# Patient Record
Sex: MALE | Race: WHITE | NOT HISPANIC OR LATINO | Employment: FULL TIME | URBAN - METROPOLITAN AREA
[De-identification: names, ages, dates, MRNs, and addresses within clinical notes are randomized per-mention and may not be internally consistent; named-entity substitution may affect disease eponyms.]

---

## 2023-05-18 ENCOUNTER — OFFICE VISIT (OUTPATIENT)
Dept: URGENT CARE | Facility: URGENT CARE | Age: 38
End: 2023-05-18
Payer: OTHER MISCELLANEOUS

## 2023-05-18 VITALS
DIASTOLIC BLOOD PRESSURE: 83 MMHG | OXYGEN SATURATION: 98 % | BODY MASS INDEX: 25.07 KG/M2 | SYSTOLIC BLOOD PRESSURE: 141 MMHG | HEART RATE: 89 BPM | WEIGHT: 190 LBS | RESPIRATION RATE: 16 BRPM | TEMPERATURE: 98.1 F

## 2023-05-18 DIAGNOSIS — S51.802A OPEN WOUND OF LEFT FOREARM, INITIAL ENCOUNTER: Primary | ICD-10-CM

## 2023-05-18 PROCEDURE — 12002 RPR S/N/AX/GEN/TRNK2.6-7.5CM: CPT | Performed by: FAMILY MEDICINE

## 2023-05-18 NOTE — PROGRESS NOTES
SUBJECTIVE:     Chief Complaint   Patient presents with     Urgent Care     Right forearm laceration with sheet metal MIIC -booster TDAP 6/21/2018. Patient states he has a history of fainting with these things     Billy Allen is a 37 year old male who presents to the clinic with a laceration on the left forearm sustained 4 hour(s) ago (around 9am).  This is a work related and accidental injury.  Was loading sheet metal and accidentally got cut  Mechanism of injury: sheet metal.    Associated symptoms: Denies numbness, weakness, or loss of function  Last tetanus booster within 10 years: yes, 2018    Patient is left handed    EXAM:   The patient appears today in alert,no apparent distress distress  VITALS: BP (!) 141/83   Pulse 89   Temp 98.1  F (36.7  C) (Tympanic)   Resp 16   Wt 86.2 kg (190 lb)   SpO2 98%   BMI 25.07 kg/m      Size of laceration: 3 centimeters  Characteristics of the laceration: bleeding- mild, clean and straight  Tendon function intact: yes  Sensation to light touch intact: yes  Pulses intact: yes  Picture included in patient's chart: no    Assessment:  Open wound of left forearm, initial encounter    PLAN:  PROCEDURE NOTE::  Wound was locally injected with 4 cc's of Lidocaine 1% with epinephrine  Good anesthesia was obtained  Prepped and draped in the usual sterile fashion  Wound cleaned with betadine/saline solution  Wound cleaned with Shur-Clens  Laceration was closed using 6 4-0 nylon interrupted sutures  After care instructions:  Keep wound clean and dry for the next 24-48 hours  Sutures out in 10 days  Signs of infection discussed today  Apply anti-bacterial ointment for 7 days  Discussed the probability of scarring    Jesús Oviedo MD  May 18, 2023 2:12 PM

## 2023-05-18 NOTE — PATIENT INSTRUCTIONS
Keep wound clean and dry for 24 hours  Topical antibiotic ointment to wound for 7 days  Monitor for wound infection    Suture removal in 10 days

## 2024-06-20 ENCOUNTER — HOSPITAL ENCOUNTER (EMERGENCY)
Facility: CLINIC | Age: 39
Discharge: HOME | End: 2024-06-20
Attending: SOCIAL WORKER | Admitting: SOCIAL WORKER
Payer: OTHER MISCELLANEOUS

## 2024-06-20 VITALS
WEIGHT: 202.38 LBS | BODY MASS INDEX: 26.82 KG/M2 | HEIGHT: 73 IN | DIASTOLIC BLOOD PRESSURE: 84 MMHG | TEMPERATURE: 98.6 F | RESPIRATION RATE: 16 BRPM | HEART RATE: 69 BPM | OXYGEN SATURATION: 98 % | SYSTOLIC BLOOD PRESSURE: 121 MMHG

## 2024-06-20 DIAGNOSIS — R55 SYNCOPE AND COLLAPSE: ICD-10-CM

## 2024-06-20 LAB
ANION GAP SERPL CALCULATED.3IONS-SCNC: 11 MMOL/L (ref 7–15)
BASOPHILS # BLD AUTO: 0.1 10E3/UL (ref 0–0.2)
BASOPHILS NFR BLD AUTO: 1 %
BUN SERPL-MCNC: 14.5 MG/DL (ref 6–20)
CALCIUM SERPL-MCNC: 9.1 MG/DL (ref 8.6–10)
CHLORIDE SERPL-SCNC: 99 MMOL/L (ref 98–107)
CREAT SERPL-MCNC: 0.97 MG/DL (ref 0.67–1.17)
DEPRECATED HCO3 PLAS-SCNC: 24 MMOL/L (ref 22–29)
EGFRCR SERPLBLD CKD-EPI 2021: >90 ML/MIN/1.73M2
EOSINOPHIL # BLD AUTO: 0.1 10E3/UL (ref 0–0.7)
EOSINOPHIL NFR BLD AUTO: 1 %
ERYTHROCYTE [DISTWIDTH] IN BLOOD BY AUTOMATED COUNT: 12.3 % (ref 10–15)
GLUCOSE BLDC GLUCOMTR-MCNC: 233 MG/DL (ref 70–99)
GLUCOSE SERPL-MCNC: 176 MG/DL (ref 70–99)
HCT VFR BLD AUTO: 41.5 % (ref 40–53)
HGB BLD-MCNC: 14.1 G/DL (ref 13.3–17.7)
HOLD SPECIMEN: NORMAL
HOLD SPECIMEN: NORMAL
IMM GRANULOCYTES # BLD: 0 10E3/UL
IMM GRANULOCYTES NFR BLD: 0 %
LYMPHOCYTES # BLD AUTO: 0.9 10E3/UL (ref 0.8–5.3)
LYMPHOCYTES NFR BLD AUTO: 11 %
MCH RBC QN AUTO: 31.2 PG (ref 26.5–33)
MCHC RBC AUTO-ENTMCNC: 34 G/DL (ref 31.5–36.5)
MCV RBC AUTO: 92 FL (ref 78–100)
MONOCYTES # BLD AUTO: 0.7 10E3/UL (ref 0–1.3)
MONOCYTES NFR BLD AUTO: 9 %
NEUTROPHILS # BLD AUTO: 6.4 10E3/UL (ref 1.6–8.3)
NEUTROPHILS NFR BLD AUTO: 78 %
NRBC # BLD AUTO: 0 10E3/UL
NRBC BLD AUTO-RTO: 0 /100
PLATELET # BLD AUTO: 258 10E3/UL (ref 150–450)
POTASSIUM SERPL-SCNC: 3.9 MMOL/L (ref 3.4–5.3)
RBC # BLD AUTO: 4.52 10E6/UL (ref 4.4–5.9)
SODIUM SERPL-SCNC: 134 MMOL/L (ref 135–145)
TROPONIN T SERPL HS-MCNC: <6 NG/L
WBC # BLD AUTO: 8.2 10E3/UL (ref 4–11)

## 2024-06-20 PROCEDURE — 36415 COLL VENOUS BLD VENIPUNCTURE: CPT | Performed by: EMERGENCY MEDICINE

## 2024-06-20 PROCEDURE — 93005 ELECTROCARDIOGRAM TRACING: CPT

## 2024-06-20 PROCEDURE — 82962 GLUCOSE BLOOD TEST: CPT

## 2024-06-20 PROCEDURE — 84484 ASSAY OF TROPONIN QUANT: CPT | Performed by: SOCIAL WORKER

## 2024-06-20 PROCEDURE — 85025 COMPLETE CBC W/AUTO DIFF WBC: CPT | Performed by: SOCIAL WORKER

## 2024-06-20 PROCEDURE — 85048 AUTOMATED LEUKOCYTE COUNT: CPT | Performed by: EMERGENCY MEDICINE

## 2024-06-20 PROCEDURE — 82374 ASSAY BLOOD CARBON DIOXIDE: CPT | Performed by: SOCIAL WORKER

## 2024-06-20 PROCEDURE — 99284 EMERGENCY DEPT VISIT MOD MDM: CPT

## 2024-06-20 ASSESSMENT — COLUMBIA-SUICIDE SEVERITY RATING SCALE - C-SSRS
1. IN THE PAST MONTH, HAVE YOU WISHED YOU WERE DEAD OR WISHED YOU COULD GO TO SLEEP AND NOT WAKE UP?: NO
6. HAVE YOU EVER DONE ANYTHING, STARTED TO DO ANYTHING, OR PREPARED TO DO ANYTHING TO END YOUR LIFE?: NO
2. HAVE YOU ACTUALLY HAD ANY THOUGHTS OF KILLING YOURSELF IN THE PAST MONTH?: NO

## 2024-06-20 ASSESSMENT — ACTIVITIES OF DAILY LIVING (ADL): ADLS_ACUITY_SCORE: 35

## 2024-06-20 NOTE — Clinical Note
Billy Allen was seen and treated in our emergency department on 6/20/2024.  He may return to work on 06/24/2024.       If you have any questions or concerns, please don't hesitate to call.      Mary Aranda MD

## 2024-06-20 NOTE — ED TRIAGE NOTES
"Pt arrives after a syncopal episode at work. States he felt dizzy and nauseated and then passed out. Witness states he looked like he slowly lowered himself down, unsure how long he was out for. States he is now feeling tired and hungry. Notes he was stressed about a meeting and did not eat breakfast this AM due to nerves. ABCs intact.     BP (!) 129/98   Pulse 62   Temp 98.6  F (37  C) (Temporal)   Resp 20   Ht 1.854 m (6' 1\")   Wt 91.8 kg (202 lb 6.1 oz)   SpO2 100%   BMI 26.70 kg/m         Triage Assessment (Adult)       Row Name 06/20/24 1334          Triage Assessment    Airway WDL WDL        Respiratory WDL    Respiratory WDL WDL        Cardiac WDL    Cardiac WDL WDL        Cognitive/Neuro/Behavioral WDL    Cognitive/Neuro/Behavioral WDL WDL                     "

## 2024-06-20 NOTE — ED PROVIDER NOTES
"  Emergency Department Note      History of Present Illness     Chief Complaint  Syncope    HPI  Billy Allen is a 38 year old male with a history of anxiety on sertraline, who presented to the emergency room with a chief complaint of syncope.  Patient reported that yesterday he was mildly hung over after having some wine the day previously and was also very anxious for work presentation today.  Today while he was presenting and was standing with a group of people he began feeling lightheaded dizzy, walked away and then lost consciousness.  Witnesses reported no seizure-like activity.  He felt nauseated and sweaty afterwards.  No palpitations, chest pain.  No sudden abdominal pain or headache.  Reports that after waking up from his syncopal episode he felt some neck tightness and stiffness which has not gone away.    Independent Historian  None    Review of External Notes  I reviewed the telemedicine visit from April 2022, patient refilling sertraline, notes depression and anxiety history  Past Medical History   Medical History and Problem List  Anxiety  Moderate Episode of Recurrent Major Depressive Disorder  Nicotine Dependence   Adjustment Disorder with mixed anxiety and depressed mood     Medications  Zoloft    Surgical History   Head and Neck Surgery, Benign Tumor     Physical Exam   Patient Vitals for the past 24 hrs:   BP Temp Temp src Pulse Resp SpO2 Height Weight   06/20/24 1515 121/84 -- -- 69 16 98 % -- --   06/20/24 1448 124/88 -- -- 68 16 97 % -- --   06/20/24 1336 (!) 129/98 98.6  F (37  C) Temporal 62 20 100 % 1.854 m (6' 1\") 91.8 kg (202 lb 6.1 oz)     General: Overall stable and nontoxic appearing  HEENT: Conjunctivae clear, no scleral icterus, mucous membranes moist; able to turn neck 45 degrees to each side  Neuro: Alert, conversant; no facial droop, no slurred speech; strength and sensation grossly intact and equal bilaterally in upper and lower extremities; gait is intact without ataxia   CV: " Regular rate, regular rhythm, radial and DP pulses equal  Respiratory: No signs of respiratory distress, lungs clear to auscultation bilaterally   Abdomen: Soft, without rigidity or rebound throughout  MSK: No tenderness to palpation over the C-spine, no lower extremity swelling    Diagnostics   Lab Results   Labs Ordered and Resulted from Time of ED Arrival to Time of ED Departure   BASIC METABOLIC PANEL - Abnormal       Result Value    Sodium 134 (*)     Potassium 3.9      Chloride 99      Carbon Dioxide (CO2) 24      Anion Gap 11      Urea Nitrogen 14.5      Creatinine 0.97      GFR Estimate >90      Calcium 9.1      Glucose 176 (*)    GLUCOSE BY METER - Abnormal    GLUCOSE BY METER POCT 233 (*)    TROPONIN T, HIGH SENSITIVITY - Normal    Troponin T, High Sensitivity <6     CBC WITH PLATELETS AND DIFFERENTIAL    WBC Count 8.2      RBC Count 4.52      Hemoglobin 14.1      Hematocrit 41.5      MCV 92      MCH 31.2      MCHC 34.0      RDW 12.3      Platelet Count 258      % Neutrophils 78      % Lymphocytes 11      % Monocytes 9      % Eosinophils 1      % Basophils 1      % Immature Granulocytes 0      NRBCs per 100 WBC 0      Absolute Neutrophils 6.4      Absolute Lymphocytes 0.9      Absolute Monocytes 0.7      Absolute Eosinophils 0.1      Absolute Basophils 0.1      Absolute Immature Granulocytes 0.0      Absolute NRBCs 0.0         Imaging  No orders to display       EKG   Electrocardiogram  ECG taken at 1341  Sinus rhythm, nonspecific intraventricular conduction delay, no signs of ischemia   Rate 61 bpm. DC interval 158. QRS duration 120. QTc 394     Normal axis, no signs of WPW, QT wnl, no signs of Brugada, no signs of ARVC or HOCM     Independent Interpretation  None  ED Course    Medications Administered  Medications - No data to display    Discussion of Management  None    Social Determinants of Health adding to complexity of care  None    ED Course  ED Course as of 06/20/24 2151   Thu Jun 20, 2024   2922  I obtained history and examined the patient as noted above.      Medical Decision Making / Diagnosis   CMS Diagnoses: None    MIPS     None    MDM  Billy Allen is a 38 year old male with a history of anxiety depression who presented to the emergency department after syncopal episode.  In emergency room patient overall stable and nontoxic-appearing.  No sudden chest pain or headache to suggest aortic dissection or intracranial catastrophe.  Stable here without chest pain.  No C-spine point tenderness and able to turn the neck 45 degrees bilaterally, [place] CT C-spine negative.  No new neurologic deficits.  Afebrile here and no neck stiffness previous to his syncopal episode therefore I feel that meningitis is quite unlikely.  Patient declined any analgesia in the emergency department.  Description of his episode sounds to be quite vasovagal with the preceding symptoms, feeling nauseated and sweaty afterwards.  Doubt cardiac etiology.  EKG without cardiac concerning signs.  Doubt ACS as etiology for her symptoms.  Patient has not seen a primary care for some time, I have placed a referral for this.  We discussed strict return precaution, he and his wife verbalized understanding.  Discharged in stable condition.    Disposition  The patient was discharged.     ICD-10 Codes:    ICD-10-CM    1. Syncope and collapse  R55 Primary Care Referral           Discharge Medications  Discharge Medication List as of 6/20/2024  3:23 PM            Scribe Disclosure:  I, Chey Limon, am serving as a scribe at 3:03 PM on 6/20/2024 to document services personally performed by Mary Aranda MD based on my observations and the provider's statements to me.        Mary Aranda MD  06/20/24 7897

## 2024-06-20 NOTE — DISCHARGE INSTRUCTIONS
You were seen in the emergency department after per your exam was overall reassuring, we did not see signs of an acute emergency at this time.      I have sent a referral for primary care physician they should give you a call to follow-up.    If you faint again, if you have chest pain or shortness of breath, if you have new numbness weakness tingling anywhere, if you have severely worsening neck pain, or other concerning symptoms please come back to the emergency department.

## 2024-06-21 LAB
ATRIAL RATE - MUSE: 61 BPM
DIASTOLIC BLOOD PRESSURE - MUSE: NORMAL MMHG
INTERPRETATION ECG - MUSE: NORMAL
P AXIS - MUSE: 69 DEGREES
PR INTERVAL - MUSE: 158 MS
QRS DURATION - MUSE: 120 MS
QT - MUSE: 392 MS
QTC - MUSE: 394 MS
R AXIS - MUSE: 35 DEGREES
SYSTOLIC BLOOD PRESSURE - MUSE: NORMAL MMHG
T AXIS - MUSE: 57 DEGREES
VENTRICULAR RATE- MUSE: 61 BPM

## 2024-07-18 ENCOUNTER — OFFICE VISIT (OUTPATIENT)
Dept: INTERNAL MEDICINE | Facility: CLINIC | Age: 39
End: 2024-07-18
Attending: SOCIAL WORKER
Payer: COMMERCIAL

## 2024-07-18 VITALS
BODY MASS INDEX: 25.84 KG/M2 | TEMPERATURE: 97.9 F | SYSTOLIC BLOOD PRESSURE: 122 MMHG | OXYGEN SATURATION: 98 % | WEIGHT: 195 LBS | RESPIRATION RATE: 18 BRPM | HEART RATE: 78 BPM | HEIGHT: 73 IN | DIASTOLIC BLOOD PRESSURE: 82 MMHG

## 2024-07-18 DIAGNOSIS — F41.9 ANXIETY: Primary | ICD-10-CM

## 2024-07-18 DIAGNOSIS — R55 SYNCOPE AND COLLAPSE: ICD-10-CM

## 2024-07-18 PROCEDURE — G2211 COMPLEX E/M VISIT ADD ON: HCPCS | Performed by: INTERNAL MEDICINE

## 2024-07-18 PROCEDURE — 99214 OFFICE O/P EST MOD 30 MIN: CPT | Performed by: INTERNAL MEDICINE

## 2024-07-18 RX ORDER — SERTRALINE HYDROCHLORIDE 100 MG/1
100 TABLET, FILM COATED ORAL DAILY
Qty: 30 TABLET | Refills: 1 | Status: SHIPPED | OUTPATIENT
Start: 2024-07-18 | End: 2024-08-14

## 2024-07-18 ASSESSMENT — ANXIETY QUESTIONNAIRES
5. BEING SO RESTLESS THAT IT IS HARD TO SIT STILL: SEVERAL DAYS
7. FEELING AFRAID AS IF SOMETHING AWFUL MIGHT HAPPEN: SEVERAL DAYS
1. FEELING NERVOUS, ANXIOUS, OR ON EDGE: MORE THAN HALF THE DAYS
GAD7 TOTAL SCORE: 9
GAD7 TOTAL SCORE: 9
2. NOT BEING ABLE TO STOP OR CONTROL WORRYING: MORE THAN HALF THE DAYS
GAD7 TOTAL SCORE: 9
8. IF YOU CHECKED OFF ANY PROBLEMS, HOW DIFFICULT HAVE THESE MADE IT FOR YOU TO DO YOUR WORK, TAKE CARE OF THINGS AT HOME, OR GET ALONG WITH OTHER PEOPLE?: VERY DIFFICULT
3. WORRYING TOO MUCH ABOUT DIFFERENT THINGS: MORE THAN HALF THE DAYS
4. TROUBLE RELAXING: SEVERAL DAYS
IF YOU CHECKED OFF ANY PROBLEMS ON THIS QUESTIONNAIRE, HOW DIFFICULT HAVE THESE PROBLEMS MADE IT FOR YOU TO DO YOUR WORK, TAKE CARE OF THINGS AT HOME, OR GET ALONG WITH OTHER PEOPLE: VERY DIFFICULT
6. BECOMING EASILY ANNOYED OR IRRITABLE: NOT AT ALL
7. FEELING AFRAID AS IF SOMETHING AWFUL MIGHT HAPPEN: SEVERAL DAYS

## 2024-07-18 NOTE — PROGRESS NOTES
"  Assessment & Plan     Anxiety, syncope, and collapse  At this time, we did spend some time reviewing his recent ER evaluation.  Patient did feel that this episode was related to some issues with increased anxiety, and he did not wish to proceed with further cardiac testing.  Based upon the reported symptoms and history, I do have a suspicion that this was more anxiety related than cardiac.  After much discussion, we did elect to make some adjustments to his sertraline dosage.  Patient will be increased to 100 mg daily for management of his mood.  Side effects of SSRI use were reviewed.  Patient will be reassessed in approximately 4 weeks.  He is aware that he can contact clinic should he have any issues with worsening mood or difficulties with his medication.  - sertraline (ZOLOFT) 100 MG tablet; Take 1 tablet (100 mg) by mouth daily          MED REC REQUIRED  Post Medication Reconciliation Status: discharge medications reconciled and changed, per note/orders  BMI  Estimated body mass index is 25.73 kg/m  as calculated from the following:    Height as of this encounter: 1.854 m (6' 1\").    Weight as of this encounter: 88.5 kg (195 lb).         See Patient Instructions    Subjective   Billy is a 38 year old, presenting for the following health issues:  ER F/U    Patient is a 38-year-old  male who presents to the clinic for an ER follow-up visit.  He was previously seen in the emergency department on June 20, 2024 after experiencing an episode of syncope.  Patient states that was giving a presentation for his place of employment, and he was feeling very anxious.  Patient does have a history of anxiety, and he has been on sertraline 50 mg by mouth per day for the past several years.  Patient reports that while presenting he began to feel very lightheaded and dizzy when he did then experienced an episode of syncope.  No seizure-like activity was noted.  He was taken to the ER for further evaluation.  While in " the emergency department, patient did have a BMP, CBC, and troponin was collected.  No abnormal findings were noted on his blood test.  EKG was unremarkable.  It was felt that the patient likely experienced a vasovagal episode secondary to his anxiety.  Patient is going to report that he has felt that his anxiety medication has not been as effective over the past several months.  He does report episodes of increased anxiety and emotional lability.  Patient has never been on any other medication for management of his mood.  Since being discharged from the emergency department, patient has had no further episodes of syncope, chest pain, or breathing difficulties.    History of Present Illness       Mental Health Follow-up:  Patient presents to follow-up on Anxiety.    Patient's anxiety since last visit has been:  Bad  The patient is not having other symptoms associated with anxiety.  Any significant life events: job concerns  Patient is feeling anxious or having panic attacks.  Patient has no concerns about alcohol or drug use.    He eats 2-3 servings of fruits and vegetables daily.He consumes 2 sweetened beverage(s) daily.He exercises with enough effort to increase his heart rate 9 or less minutes per day.  He exercises with enough effort to increase his heart rate 3 or less days per week. He is missing 1 dose(s) of medications per week.  He is not taking prescribed medications regularly due to remembering to take.         Review of Systems  CONSTITUTIONAL: NEGATIVE for fever, chills, change in weight  INTEGUMENTARY/SKIN: NEGATIVE for worrisome rashes, moles or lesions  ENT/MOUTH: NEGATIVE for ear, mouth and throat problems  RESP: NEGATIVE for significant cough or SOB  CV: NEGATIVE for chest pain, palpitations or peripheral edema  GI: NEGATIVE for nausea, abdominal pain, heartburn, or change in bowel habits  : NEGATIVE for frequency, dysuria, or hematuria  MUSCULOSKELETAL: NEGATIVE for significant arthralgias or  "myalgia  NEURO: NEGATIVE for weakness, dizziness or paresthesias  PSYCHIATRIC: Positive for anxiety.      Objective    /82   Pulse 78   Temp 97.9  F (36.6  C)   Resp 18   Ht 1.854 m (6' 1\")   Wt 88.5 kg (195 lb)   SpO2 98%   BMI 25.73 kg/m    Body mass index is 25.73 kg/m .  Physical Exam  Vitals reviewed.   Constitutional:       Appearance: Normal appearance.   HENT:      Head: Normocephalic and atraumatic.      Mouth/Throat:      Mouth: Mucous membranes are moist.      Pharynx: Oropharynx is clear.   Eyes:      Extraocular Movements: Extraocular movements intact.      Conjunctiva/sclera: Conjunctivae normal.      Pupils: Pupils are equal, round, and reactive to light.   Cardiovascular:      Rate and Rhythm: Normal rate and regular rhythm.      Pulses: Normal pulses.      Heart sounds: Normal heart sounds.   Pulmonary:      Effort: Pulmonary effort is normal.      Breath sounds: Normal breath sounds.   Skin:     General: Skin is warm and dry.      Capillary Refill: Capillary refill takes less than 2 seconds.   Neurological:      Mental Status: He is alert.   Psychiatric:         Attention and Perception: Attention and perception normal.         Mood and Affect: Mood and affect normal.         Speech: Speech normal.      Comments: No mental health score sheets completed today.            Signed Electronically by: Jose Nolan MD    "

## 2024-08-14 ENCOUNTER — VIRTUAL VISIT (OUTPATIENT)
Dept: INTERNAL MEDICINE | Facility: CLINIC | Age: 39
End: 2024-08-14
Payer: COMMERCIAL

## 2024-08-14 DIAGNOSIS — F41.9 ANXIETY: Primary | ICD-10-CM

## 2024-08-14 PROCEDURE — 99442 PR PHYSICIAN TELEPHONE EVALUATION 11-20 MIN: CPT | Mod: 95 | Performed by: INTERNAL MEDICINE

## 2024-08-14 RX ORDER — SERTRALINE HYDROCHLORIDE 100 MG/1
100 TABLET, FILM COATED ORAL DAILY
Qty: 90 TABLET | Refills: 3 | Status: SHIPPED | OUTPATIENT
Start: 2024-08-14

## 2024-08-14 NOTE — PROGRESS NOTES
Billy is a 38 year old who is being evaluated via a billable video visit.    How would you like to obtain your AVS? Mail a copy  If the video visit is dropped, the invitation should be resent by: Text to cell phone: [phone]  Will anyone else be joining your video visit? No      Assessment & Plan     Anxiety  At this time, patient does report that his anxiety has improved significantly after increasing his daily dose of sertraline to 100 mg.  After much discussion, we did like to continue his sertraline as currently prescribed.  Side effects of SSRIs were reviewed.  Patient is aware that should he have any issues with his medication or worsening of his mood that he can contact the clinic for additional assistance.  Patient had no other questions or concerns at this time.  - sertraline (ZOLOFT) 100 MG tablet; Take 1 tablet (100 mg) by mouth daily        See Patient Instructions    Subjective   Billy is a 38 year old, presenting for the following health issues:  Follow Up    Patient is a 38-year-old  male who participates in virtual visit for follow-up on his anxiety.  He was last seen on July 18 for an ER follow-up visit.  He had an issue of syncope in June 2024.  His cardiac workup was unremarkable.  It was felt that there was some issues with vasovagal syncope secondary to anxiety.  Patient did have his sertraline dosage increased to 100 mg daily at that visit.  Patient reports that the first few weeks on the increased dose of medication were difficult as he did not feel that it was helping his mood.  He reports over the last 1 to 2 weeks that his mood has improved significantly.  Patient did initially have some issues with fatigue when starting the increased dose of sertraline, but that has subsequently resolved as well.  Patient reports that his anxiety is significantly improved from his last visit.  He does feel that the medication has been quite helpful.  He would like to continue his medication as  currently prescribed.           Review of Systems  CONSTITUTIONAL: NEGATIVE for fever, chills, change in weight  INTEGUMENTARY/SKIN: NEGATIVE for worrisome rashes, moles or lesions  RESP: NEGATIVE for significant cough or SOB  CV: NEGATIVE for chest pain, palpitations or peripheral edema  GI: NEGATIVE for nausea, abdominal pain, heartburn, or change in bowel habits  MUSCULOSKELETAL: NEGATIVE for significant arthralgias or myalgia  NEURO: NEGATIVE for weakness, dizziness or paresthesias  PSYCHIATRIC: NEGATIVE for changes in mood or affect      Objective         Vitals:  No vitals were obtained today due to virtual visit.    Physical Exam   GENERAL: alert and no distress  EYES: Eyes grossly normal to inspection.  No discharge or erythema, or obvious scleral/conjunctival abnormalities.  RESP: No audible wheeze, cough, or visible cyanosis.    SKIN: Visible skin clear. No significant rash, abnormal pigmentation or lesions.  NEURO: Cranial nerves grossly intact.  Mentation and speech appropriate for age.  PSYCH: Appropriate affect, tone, and pace of words        Video-Visit Details    Type of service:  Video Visit   Originating Location (pt. Location): Other Work  Distant Location (provider location):  On-site  Platform used for Video Visit: Jorge  Signed Electronically by: Jose Nolan MD

## 2025-04-05 ENCOUNTER — HEALTH MAINTENANCE LETTER (OUTPATIENT)
Age: 40
End: 2025-04-05

## 2025-06-02 ENCOUNTER — E-VISIT (OUTPATIENT)
Dept: URGENT CARE | Facility: CLINIC | Age: 40
End: 2025-06-02
Payer: COMMERCIAL

## 2025-06-02 DIAGNOSIS — J02.9 SORE THROAT: Primary | ICD-10-CM

## 2025-06-02 NOTE — PATIENT INSTRUCTIONS
Dear Billy,    After reviewing your responses, I would like you to come in for a swab to make sure we treat you correctly. This swab is to evaluate you for possible Strep Throat, and should be scheduled for today or tomorrow. Please use the Schedule Now button in [employer]) to schedule your swab. Otherwise, click this link to schedule a lab only appointment.    Lab appointments are not available at most locations on the weekends. If no Lab Only appointment is available, you should be seen in any of our convenient Urgent Care Centers for an in person visit, which can be found on our website here.    You will receive instructions with your results in [employer] once they are available.     If your symptoms worsen, you develop difficulty breathing, difficulty with drinking enough to stay hydrated, difficulty swallowing your saliva or have fevers for more than 5 days, please contact your primary care provider for an appointment or visit an Urgent Care Center to be seen.      Thanks again for choosing us as your health care partner.   Steven Jasso MD, MD  Sore Throat: Care Instructions  Overview     Infection by bacteria or a virus causes most sore throats. Cigarette smoke, dry air, air pollution, allergies, and yelling can also cause a sore throat. Sore throats can be painful and annoying. Fortunately, most sore throats go away on their own. If you have a bacterial infection, your doctor may prescribe antibiotics.  Follow-up care is a key part of your treatment and safety. Be sure to make and go to all appointments, and call your doctor if you are having problems. It's also a good idea to know your test results and keep a list of the medicines you take.  How can you care for yourself at home?  If your doctor prescribed antibiotics, take them as directed. Do not stop taking them just because you feel better. You need to take the full course of antibiotics.  Gargle with warm salt water several times a day to  "help reduce swelling and relieve pain. Mix 1/2 teaspoon of salt in 1 cup of warm water.  Take an over-the-counter pain medicine, such as acetaminophen (Tylenol), ibuprofen (Advil, Motrin), or naproxen (Aleve). Read and follow all instructions on the label.  Be careful when taking over-the-counter cold or flu medicines and Tylenol at the same time. Many of these medicines have acetaminophen, which is Tylenol. Read the labels to make sure that you are not taking more than the recommended dose. Too much acetaminophen (Tylenol) can be harmful.  Drink plenty of fluids. Fluids may help soothe an irritated throat. Hot fluids, such as tea or soup, may help decrease throat pain.  Use over-the-counter throat lozenges to soothe pain. Regular cough drops or hard candy may also help. These should not be given to young children because of the risk of choking.  Do not smoke or allow others to smoke around you. If you need help quitting, talk to your doctor about stop-smoking programs and medicines. These can increase your chances of quitting for good.  Use a vaporizer or humidifier to add moisture to your bedroom. Follow the directions for cleaning the machine.  When should you call for help?   Call your doctor now or seek immediate medical care if:    You have trouble breathing.     Your sore throat gets much worse on one side.     You have new or worse trouble swallowing.     You have a new or higher fever.   Watch closely for changes in your health, and be sure to contact your doctor if you do not get better as expected.  Where can you learn more?  Go to https://www.[employer].net/patiented  Enter U420 in the search box to learn more about \"Sore Throat: Care Instructions.\"  Current as of: October 27, 2024  Content Version: 14.4    1341-8419 [facility].   Care instructions adapted under license by your healthcare professional. If you have questions about a medical condition or this instruction, always ask your " healthcare professional. [employer] [facility] [employer] [facility] disclaims any warranty or liability for your use of this information.

## 2025-06-09 DIAGNOSIS — F41.9 ANXIETY: ICD-10-CM

## 2025-06-09 RX ORDER — SERTRALINE HYDROCHLORIDE 100 MG/1
100 TABLET, FILM COATED ORAL DAILY
Qty: 90 TABLET | Refills: 3 | OUTPATIENT
Start: 2025-06-09